# Patient Record
Sex: MALE | Race: WHITE | NOT HISPANIC OR LATINO | URBAN - METROPOLITAN AREA
[De-identification: names, ages, dates, MRNs, and addresses within clinical notes are randomized per-mention and may not be internally consistent; named-entity substitution may affect disease eponyms.]

---

## 2022-07-20 ENCOUNTER — OFFICE (OUTPATIENT)
Dept: URBAN - METROPOLITAN AREA CLINIC 43 | Facility: CLINIC | Age: 47
End: 2022-07-20

## 2022-07-20 VITALS
TEMPERATURE: 97.6 F | DIASTOLIC BLOOD PRESSURE: 73 MMHG | SYSTOLIC BLOOD PRESSURE: 115 MMHG | HEART RATE: 61 BPM | WEIGHT: 155 LBS | HEIGHT: 69 IN

## 2022-07-20 DIAGNOSIS — Z12.11 SCREENING FOR COLON CANCER: ICD-10-CM

## 2022-07-20 PROCEDURE — 99203 OFFICE O/P NEW LOW 30 MIN: CPT | Performed by: INTERNAL MEDICINE

## 2022-07-20 NOTE — SERVICEHPINOTES
I had the pleasure of seeing this patient today.As you know, he is a 47-year-old man who several months ago was having several episodes of diarrhea alternating with constipation. Constipation was bad enough that he would never bowel motor for a week, but then he had a couple of episodes of diarrhea where he actually had incontinence. Not clear to me if these were just episodes of food poisoning or because he was eating a poor diet, stating that he would eat 6 donuts at a time.Since then he has changed his diet dramatically, and now has had normal, fully formed stools without diarrhea or constipation. In addition he has not had a colonoscopy since 1997.He denies any nausea, vomiting, hematemesis, melena, hematochezia, or unintentional weight loss.

## 2022-09-10 LAB
CELIAC DISEASE COMPREHENSIVE PANEL: IMMUNOGLOBULIN A: 112 MG/DL (ref 47–310)
CELIAC DISEASE COMPREHENSIVE PANEL: INTERPRETATION: (no result)
CELIAC DISEASE COMPREHENSIVE PANEL: TISSUE TRANSGLUTAMINASE AB, IGA: <1 U/ML

## 2022-09-28 ENCOUNTER — AMBULATORY SURGICAL CENTER (OUTPATIENT)
Dept: URBAN - METROPOLITAN AREA SURGERY 42 | Facility: SURGERY | Age: 47
End: 2022-09-28

## 2022-09-28 VITALS
SYSTOLIC BLOOD PRESSURE: 114 MMHG | DIASTOLIC BLOOD PRESSURE: 63 MMHG | OXYGEN SATURATION: 98 % | TEMPERATURE: 98.4 F | RESPIRATION RATE: 14 BRPM | WEIGHT: 165 LBS | HEART RATE: 61 BPM | HEIGHT: 69 IN

## 2022-09-28 DIAGNOSIS — Z12.11 ENCOUNTER FOR SCREENING FOR MALIGNANT NEOPLASM OF COLON: ICD-10-CM

## 2022-09-28 DIAGNOSIS — K63.3 ULCER OF INTESTINE: ICD-10-CM

## 2022-09-28 DIAGNOSIS — K63.89 OTHER SPECIFIED DISEASES OF INTESTINE: ICD-10-CM

## 2022-09-28 LAB — SURGICAL: PDF REPORT: (no result)

## 2022-09-28 PROCEDURE — 45380 COLONOSCOPY AND BIOPSY: CPT | Performed by: INTERNAL MEDICINE

## 2022-09-28 RX ORDER — MESALAMINE 1.2 G/1
4.8 TABLET, DELAYED RELEASE ORAL
Qty: 120 | Status: ACTIVE
Start: 2022-09-28

## 2023-04-23 ENCOUNTER — HOSPITAL ENCOUNTER (EMERGENCY)
Dept: HOSPITAL 54 - ER | Age: 48
LOS: 1 days | Discharge: HOME | End: 2023-04-24
Payer: COMMERCIAL

## 2023-04-23 VITALS — BODY MASS INDEX: 24.44 KG/M2 | HEIGHT: 69 IN | WEIGHT: 165 LBS

## 2023-04-23 DIAGNOSIS — J32.9: Primary | ICD-10-CM

## 2023-04-23 PROCEDURE — 96372 THER/PROPH/DIAG INJ SC/IM: CPT

## 2023-04-23 PROCEDURE — 99283 EMERGENCY DEPT VISIT LOW MDM: CPT

## 2023-04-24 VITALS — SYSTOLIC BLOOD PRESSURE: 131 MMHG | DIASTOLIC BLOOD PRESSURE: 83 MMHG
